# Patient Record
Sex: MALE | Race: WHITE | URBAN - METROPOLITAN AREA
[De-identification: names, ages, dates, MRNs, and addresses within clinical notes are randomized per-mention and may not be internally consistent; named-entity substitution may affect disease eponyms.]

---

## 2020-02-28 ENCOUNTER — INPATIENT (INPATIENT)
Facility: HOSPITAL | Age: 50
LOS: 3 days | Discharge: HOME | End: 2020-03-03
Attending: HOSPITALIST | Admitting: HOSPITALIST
Payer: SELF-PAY

## 2020-02-28 VITALS
RESPIRATION RATE: 16 BRPM | WEIGHT: 190.04 LBS | SYSTOLIC BLOOD PRESSURE: 146 MMHG | TEMPERATURE: 98 F | HEART RATE: 90 BPM | DIASTOLIC BLOOD PRESSURE: 81 MMHG | OXYGEN SATURATION: 98 %

## 2020-02-28 DIAGNOSIS — I25.10 ATHEROSCLEROTIC HEART DISEASE OF NATIVE CORONARY ARTERY WITHOUT ANGINA PECTORIS: ICD-10-CM

## 2020-02-28 LAB
ALBUMIN SERPL ELPH-MCNC: 5 G/DL — SIGNIFICANT CHANGE UP (ref 3.5–5.2)
ALP SERPL-CCNC: 96 U/L — SIGNIFICANT CHANGE UP (ref 30–115)
ALT FLD-CCNC: 24 U/L — SIGNIFICANT CHANGE UP (ref 0–41)
ANION GAP SERPL CALC-SCNC: 14 MMOL/L — SIGNIFICANT CHANGE UP (ref 7–14)
AST SERPL-CCNC: 23 U/L — SIGNIFICANT CHANGE UP (ref 0–41)
BILIRUB SERPL-MCNC: 0.3 MG/DL — SIGNIFICANT CHANGE UP (ref 0.2–1.2)
BUN SERPL-MCNC: 12 MG/DL — SIGNIFICANT CHANGE UP (ref 10–20)
CALCIUM SERPL-MCNC: 10.2 MG/DL — HIGH (ref 8.5–10.1)
CHLORIDE SERPL-SCNC: 103 MMOL/L — SIGNIFICANT CHANGE UP (ref 98–110)
CO2 SERPL-SCNC: 26 MMOL/L — SIGNIFICANT CHANGE UP (ref 17–32)
CREAT SERPL-MCNC: 0.9 MG/DL — SIGNIFICANT CHANGE UP (ref 0.7–1.5)
GLUCOSE SERPL-MCNC: 95 MG/DL — SIGNIFICANT CHANGE UP (ref 70–99)
HCT VFR BLD CALC: 47.8 % — SIGNIFICANT CHANGE UP (ref 42–52)
HGB BLD-MCNC: 16.1 G/DL — SIGNIFICANT CHANGE UP (ref 14–18)
LIDOCAIN IGE QN: 33 U/L — SIGNIFICANT CHANGE UP (ref 7–60)
MCHC RBC-ENTMCNC: 30.1 PG — SIGNIFICANT CHANGE UP (ref 27–31)
MCHC RBC-ENTMCNC: 33.7 G/DL — SIGNIFICANT CHANGE UP (ref 32–37)
MCV RBC AUTO: 89.3 FL — SIGNIFICANT CHANGE UP (ref 80–94)
NRBC # BLD: 0 /100 WBCS — SIGNIFICANT CHANGE UP (ref 0–0)
PLATELET # BLD AUTO: 317 K/UL — SIGNIFICANT CHANGE UP (ref 130–400)
POTASSIUM SERPL-MCNC: 4.5 MMOL/L — SIGNIFICANT CHANGE UP (ref 3.5–5)
POTASSIUM SERPL-SCNC: 4.5 MMOL/L — SIGNIFICANT CHANGE UP (ref 3.5–5)
PROT SERPL-MCNC: 8 G/DL — SIGNIFICANT CHANGE UP (ref 6–8)
RBC # BLD: 5.35 M/UL — SIGNIFICANT CHANGE UP (ref 4.7–6.1)
RBC # FLD: 12.1 % — SIGNIFICANT CHANGE UP (ref 11.5–14.5)
SODIUM SERPL-SCNC: 143 MMOL/L — SIGNIFICANT CHANGE UP (ref 135–146)
TROPONIN T SERPL-MCNC: <0.01 NG/ML — SIGNIFICANT CHANGE UP
WBC # BLD: 8.73 K/UL — SIGNIFICANT CHANGE UP (ref 4.8–10.8)
WBC # FLD AUTO: 8.73 K/UL — SIGNIFICANT CHANGE UP (ref 4.8–10.8)

## 2020-02-28 PROCEDURE — 93010 ELECTROCARDIOGRAM REPORT: CPT

## 2020-02-28 PROCEDURE — 99220: CPT

## 2020-02-28 PROCEDURE — 71045 X-RAY EXAM CHEST 1 VIEW: CPT | Mod: 26

## 2020-02-28 PROCEDURE — 93010 ELECTROCARDIOGRAM REPORT: CPT | Mod: 77

## 2020-02-28 RX ORDER — ASPIRIN/CALCIUM CARB/MAGNESIUM 324 MG
81 TABLET ORAL ONCE
Refills: 0 | Status: COMPLETED | OUTPATIENT
Start: 2020-02-28 | End: 2020-02-28

## 2020-02-28 RX ADMIN — Medication 81 MILLIGRAM(S): at 21:22

## 2020-02-28 NOTE — ED PROVIDER NOTE - NS ED ROS FT
General: No fever, chills, or weakness.  Eyes:  No visual changes, eye pain or discharge.  ENMT:  No hearing changes, pain, no sore throat or runny nose, no difficulty swallowing  Cardiac: CP, SOB. No palpitations.  Respiratory:  No cough or respiratory distress. No hemoptysis. No history of asthma or RAD.  GI:  No nausea, vomiting, diarrhea or abdominal pain.  :  No dysuria, frequency or burning.  MS:  No myalgia, muscle weakness, joint pain or back pain.  Neuro:  No headache.  No LOC. No change in ambulation. No dizziness.  Skin:  No skin rash.

## 2020-02-28 NOTE — ED PROVIDER NOTE - PROGRESS NOTE DETAILS
49M h/o HLD, hypertriglyceridemia, active smoker p/w CP for 1 week, radiating to back and left arm. A/w LEES. No other sxs, PE risk factors. Strong family history. Currently in no CP. EKG non ischemic. Pending labs and XR. Will obs, pt is amenable to obs. AA: Will obs, negative troponin

## 2020-02-28 NOTE — ED CDU PROVIDER INITIAL DAY NOTE - OBJECTIVE STATEMENT
50y/o male with pmh of high triglycerides, hld- not on meds, pt. c/o left sided cp associated with mid back pain, ;left arm pain and dyspnea on exertion. denies recent illnesses, fever, cough, vomiting, abdominal pain. pt. is a smoker. pt.'s younger brother has a cardiac stent, pt.'s father and older brother had cabg done.

## 2020-02-28 NOTE — ED PROVIDER NOTE - OBJECTIVE STATEMENT
49M h/o HLD, hypertriglyceridemia, active smoker p/w substernal CP for 1 week. Sharp, radiates to back and down left arm. Worse w/ exertion. A/w LEES. Pt has never had CP or LEES before. Denies fever/chills, cough, palpitations, abd pain, n/v/d, recent travel, leg swelling, recent surgery/immobilization. Pt's brother had CABG in mid 50s, father had CABG in 70s.

## 2020-02-28 NOTE — ED ADULT NURSE NOTE - OBJECTIVE STATEMENT
Pt presents c/o substernal chest pain sharp pain x 1 week radiating to the back down to the left arm , worse with exertion .Pt as of this moment denies any chest pain , denies headache , denies dizziness , denies arm pain , denies neck pain , denies abdominal pain , denies back pain , AO  x 4 , no labored breathing , speaks in full sentences , denies nausea , no vomiting noted , denies palpitations , no SOB , no cough ,,ambulatory , afebrile , not diaphoretic

## 2020-02-28 NOTE — ED PROVIDER NOTE - ATTENDING CONTRIBUTION TO CARE
48 yo m with pmh of HLD, presents with cp x 1 week.  pt says radiates down his left arm.  also with LEES.  no recent travel or long drives, no leg swelling, no cough, no fever.  +fam h/o cad.  exam: nad, ncat, perrl, eomi, mmm, rrr, ctab, abd soft, nt,nd aox3, imp: pt with chest pain, +fam h/o, and current smoker.  will r/o acs, ekg, cxr

## 2020-02-28 NOTE — ED PROVIDER NOTE - PHYSICAL EXAMINATION
Constitutional: Well developed, well nourished. NAD. Good general hygiene  Head: Atraumatic.  Eyes: PERRLA. EOMI without discomfort.   ENT: No nasal discharge. Mucous membranes moist.  Neck: Supple. Painless ROM.  Cardiovascular: Regular rhythm. Regular rate. Normal S1 and S2. No murmurs. 2+ pulses in all extremities.   Pulmonary: Normal respiratory rate and effort. Lungs clear to auscultation bilaterally. No wheezing, rales, or rhonchi. Bilateral, equal lung expansion.   Abdominal: Soft. Nondistended. Nontender. No rebound or guarding.   Extremities. Pelvis stable. No lower extremity edema. Symmetric calves.  Skin: No rashes.   Neuro: AAOx3. No focal neurological deficits.

## 2020-02-28 NOTE — ED ADULT TRIAGE NOTE - CHIEF COMPLAINT QUOTE
pt c/o midsternal chest pain/abdominal pain/sob with exertion x 1 week worsening this afternoon with intermittent nausea /headache. denies numbness/tingling.

## 2020-02-29 LAB
CHOLEST SERPL-MCNC: 230 MG/DL — HIGH (ref 100–200)
HDLC SERPL-MCNC: 45 MG/DL — SIGNIFICANT CHANGE UP
LIPID PNL WITH DIRECT LDL SERPL: 164 MG/DL — HIGH (ref 4–129)
TOTAL CHOLESTEROL/HDL RATIO MEASUREMENT: 5.1 RATIO — SIGNIFICANT CHANGE UP (ref 4–5.5)
TRIGL SERPL-MCNC: 166 MG/DL — HIGH (ref 10–149)
TROPONIN T SERPL-MCNC: <0.01 NG/ML — SIGNIFICANT CHANGE UP

## 2020-02-29 PROCEDURE — 99217: CPT

## 2020-02-29 PROCEDURE — 75574 CT ANGIO HRT W/3D IMAGE: CPT | Mod: 26

## 2020-02-29 PROCEDURE — 99222 1ST HOSP IP/OBS MODERATE 55: CPT

## 2020-02-29 RX ORDER — ATORVASTATIN CALCIUM 80 MG/1
80 TABLET, FILM COATED ORAL AT BEDTIME
Refills: 0 | Status: DISCONTINUED | OUTPATIENT
Start: 2020-02-29 | End: 2020-03-03

## 2020-02-29 RX ORDER — ASPIRIN/CALCIUM CARB/MAGNESIUM 324 MG
81 TABLET ORAL DAILY
Refills: 0 | Status: DISCONTINUED | OUTPATIENT
Start: 2020-02-29 | End: 2020-03-03

## 2020-02-29 RX ORDER — METOPROLOL TARTRATE 50 MG
50 TABLET ORAL ONCE
Refills: 0 | Status: COMPLETED | OUTPATIENT
Start: 2020-02-29 | End: 2020-02-29

## 2020-02-29 RX ORDER — HEPARIN SODIUM 5000 [USP'U]/ML
5000 INJECTION INTRAVENOUS; SUBCUTANEOUS EVERY 8 HOURS
Refills: 0 | Status: DISCONTINUED | OUTPATIENT
Start: 2020-02-29 | End: 2020-03-03

## 2020-02-29 RX ORDER — SODIUM CHLORIDE 9 MG/ML
1000 INJECTION INTRAMUSCULAR; INTRAVENOUS; SUBCUTANEOUS ONCE
Refills: 0 | Status: COMPLETED | OUTPATIENT
Start: 2020-02-29 | End: 2020-02-29

## 2020-02-29 RX ORDER — METOPROLOL TARTRATE 50 MG
5 TABLET ORAL ONCE
Refills: 0 | Status: DISCONTINUED | OUTPATIENT
Start: 2020-02-29 | End: 2020-03-01

## 2020-02-29 RX ORDER — METOPROLOL TARTRATE 50 MG
25 TABLET ORAL DAILY
Refills: 0 | Status: DISCONTINUED | OUTPATIENT
Start: 2020-02-29 | End: 2020-03-01

## 2020-02-29 RX ADMIN — Medication 81 MILLIGRAM(S): at 18:03

## 2020-02-29 RX ADMIN — Medication 50 MILLIGRAM(S): at 06:35

## 2020-02-29 RX ADMIN — Medication 25 MILLIGRAM(S): at 18:02

## 2020-02-29 RX ADMIN — HEPARIN SODIUM 5000 UNIT(S): 5000 INJECTION INTRAVENOUS; SUBCUTANEOUS at 22:15

## 2020-02-29 RX ADMIN — SODIUM CHLORIDE 1000 MILLILITER(S): 9 INJECTION INTRAMUSCULAR; INTRAVENOUS; SUBCUTANEOUS at 16:58

## 2020-02-29 RX ADMIN — Medication 50 MILLIGRAM(S): at 09:24

## 2020-02-29 RX ADMIN — Medication 50 MILLIGRAM(S): at 10:51

## 2020-02-29 RX ADMIN — ATORVASTATIN CALCIUM 80 MILLIGRAM(S): 80 TABLET, FILM COATED ORAL at 22:16

## 2020-02-29 NOTE — ED ADULT NURSE REASSESSMENT NOTE - NS ED NURSE REASSESS COMMENT FT1
Pt reassessed A/O times 4 Vs stable remain comfortable CTA order is done completed ,pt is seen evaluate by Ed attending did eat his lunch no N/V  no dizziness ambulate steady ,pt admitted to  telemetry will continue to monitor .
report given to ricky Guerrero transported to ED 3bed 8
Pt observed comfortably sleeping, no acute distress noted. Continue to monitor.
Received pt from main ED, alert, oriented x 3, denies any pain, no complaints offered at this time. Will monitor.

## 2020-02-29 NOTE — CONSULT NOTE ADULT - ASSESSMENT
48y/o male with PMH of high triglycerides, DLD , HTN ,  not on meds, presented to ED with LEES and intermittent CP and back pain 50y/o male with PMH of high triglycerides, DLD , HTN ,  not on meds, presented to ED with LEES and intermittent CP and back pain.      Impression:  New onset dyspnea on exertion associated with dizziness and atypical CP -  Abnormal CCTA: r/o occlusive CAD   Chronic smoker, h/o HTN/DLD, family h/o CAD    Plan:  Tele monitoring  ASA, high intensity statin, low dose BB  check ECHO, HBA1C  possible cardiac cath on monday     will d/w attending 48y/o male with PMH of high triglycerides, DLD , HTN ,  not on meds, presented to ED with LEES and intermittent CP and back pain.      Impression:  New onset dyspnea on exertion associated with dizziness and atypical CP -  Abnormal CCTA: r/o occlusive CAD (MI ruled out, no ischemia on ECG, currently asymptomatic)  Chronic smoker, h/o HTN/DLD, family h/o CAD    Plan:  Tele monitoring  ASA, high intensity statin, low dose BB  check ECHO, HBA1C  possible cardiac cath on monday     will d/w attending

## 2020-02-29 NOTE — H&P ADULT - NSICDXFAMILYHX_GEN_ALL_CORE_FT
FAMILY HISTORY:  Father  Still living? Unknown  Family history of coronary artery disease, Age at diagnosis: Age Unknown    Sibling  Still living? Unknown  Family history of coronary artery disease, Age at diagnosis: Age Unknown

## 2020-02-29 NOTE — ED CDU PROVIDER DISPOSITION NOTE - CLINICAL COURSE
Pt placed into observation for chest pain. CE neg. CCTA + single vessel disease. Admitted to tele for further evaluation.

## 2020-02-29 NOTE — CONSULT NOTE ADULT - ATTENDING COMMENTS
Seen / examined last night on rounds.  Above reviewed.    No cardiac history.  Risk factors include HTN, HLD, smoking, and family history.    New onset exertional dyspnea (few days).  Hemodynamics stable.  Euvolemic.  EKG: NSR, RSR'  Ruled-out MI.  CCTA: 2 moderate proximal RCA stenoses.    Overall, concerning for low-threshold angina.    RECOMMEND:  - ASA, Lipitor, low-dose beta-blocker (metoprolol 12.5mg q6).  - ECHO.  - Cath Monday.

## 2020-02-29 NOTE — H&P ADULT - ATTENDING COMMENTS
Pt seen and examined independently. He feels well now and denies SOB or chest pain/pressure.  He presented with 2 weeks of SOB on exertion along with left sided chest pain and radiation to the back and left arm.  No other complaints. + cough attributed to tobacco use  He has multiple risk factors for CAD: smoker, hyperlipidemia, + FH of CAD    VS reviewed  general - NAD  chest - CTA b/l  CVS - RRR no murmur  no bruits  abdomen - soft, NT, ND  extremities - no edema  Neuro - AA&O    CCTA + moderate stenosis of RCA  for cardiac cath tomorrow  NPO after midnight  ASA/statin/Bblocker  check ECHO  telemetry reviewed - no events  smoking cessation  Hyperlipidemia - continue high intensity statin    I reviewed the resident's note and I agree with the history, physical exam, assessment and plan with additions as above.    PROGRESS NOTE HANDOFF    Pending: cardiac cath on Monday    Disposition: home

## 2020-02-29 NOTE — CONSULT NOTE ADULT - SUBJECTIVE AND OBJECTIVE BOX
Patient is a 49y old  Male who presents with a chief complaint of Chest pain (29 Feb 2020 14:58)    HPI:  48y/o male with PMH of high triglycerides, DLD , HTN ,  not on meds, presented to ED with  complaints of  left sided to substernal chest pain  associated with mid back pain, pt does endorsed radiations of pain to  left arm along with dyspnea on exertion. He denies recent illnesses, fever, cough, vomiting, abdominal pain. pt is a smoker. And his younger bother has a cardiac stent at age of 55 and father had CABG at age of 71. Pt underwent CTA coronary in ED which was consistent with CAD RAD category 3 with RCA territory stenosis . Pt will be admitted for further workup, (29 Feb 2020 14:58)      ROS:  All other systems reviewed and are negative    PAST MEDICAL & SURGICAL HISTORY  HLD (hyperlipidemia)      FAMILY HISTORY:  FAMILY HISTORY:  Family history of coronary artery disease (Father, Sibling)      SOCIAL HISTORY:  []smoker- 2PPD  []Alcohol  []Drug    ALLERGIES:  No Known Allergies      MEDICATIONS:  MEDICATIONS  (STANDING):  aspirin  chewable 81 milliGRAM(s) Oral daily  atorvastatin 80 milliGRAM(s) Oral at bedtime  heparin  Injectable 5000 Unit(s) SubCutaneous every 8 hours  metoprolol succinate ER 25 milliGRAM(s) Oral daily  metoprolol tartrate Injectable 5 milliGRAM(s) IV Push once    MEDICATIONS  (PRN):      HOME MEDICATIONS:  Home Medications:      VITALS:   T(F): 97.1 (02-29 @ 16:08), Max: 98.5 (02-29 @ 15:04)  HR: 66 (02-29 @ 16:08) (62 - 90)  BP: 107/58 (02-29 @ 16:08) (97/58 - 146/81)  BP(mean): --  RR: 16 (02-29 @ 16:08) (16 - 19)  SpO2: 98% (02-29 @ 16:00) (97% - 99%)    I&O's Summary      PHYSICAL EXAM:  GEN: Alert and oriented X 3, No acute distress  HEENT: no pallor  NECK: Supple, no JVD  LUNGS: Clear to auscultation bilaterally  CARDIOVASCULAR: S1/S2 regular, no murmurs or rubs  ABD: Soft, BS+  EXT: No Lower extremity edema/cyanosis  NEURO: Non focal  SKIN: Intact    LABS:                        16.1   8.73  )-----------( 317      ( 28 Feb 2020 18:40 )             47.8     02-28    143  |  103  |  12  ----------------------------<  95  4.5   |  26  |  0.9    Ca    10.2<H>      28 Feb 2020 18:40    TPro  8.0  /  Alb  5.0  /  TBili  0.3  /  DBili  x   /  AST  23  /  ALT  24  /  AlkPhos  96  02-28      Troponin T, Serum: <0.01 ng/mL (02-29-20 @ 00:04)  Troponin T, Serum: <0.01 ng/mL (02-28-20 @ 18:40)    CARDIAC MARKERS ( 29 Feb 2020 00:04 )  x     / <0.01 ng/mL / x     / x     / x      CARDIAC MARKERS ( 28 Feb 2020 18:40 )  x     / <0.01 ng/mL / x     / x     / x            Troponin trend:      02-29 Chol 230<H> <H> HDL 45 Trig 166<H>  Hemoglobin A1C   Thyroid      RADIOLOGY:  -CXR:  < from: Xray Chest 1 View-PORTABLE IMMEDIATE (02.28.20 @ 20:40) >    Impression:      No radiographic evidence of acute cardiopulmonary disease.    < end of copied text >    -CCTA:  < from: CT Heart with Coronaries (02.29.20 @ 12:41) >    IMPRESSION:    1.  Two moderate stenoses in the proximal RCA due to noncalcified plaque. Other findings as above. Follow-up cardiology is recommended.    2. Total coronary calcium score is 81. Total calcium volume is 75.  For a 49 old female , this corresponds to 95 percentile.     CAD RAD: 3    < end of copied text >      ECG:  < from: 12 Lead ECG (02.28.20 @ 18:37) >  Diagnosis Line *** Poor data quality, interpretation may be adversely affected  Normal sinus rhythm  Incomplete right bundle branch block  Borderline ECG    < end of copied text >    TELEMETRY EVENTS:  none Patient is a 49y old  Male who presents with a chief complaint of Chest pain (29 Feb 2020 14:58)    HPI:  50y/o male with PMH of high triglycerides, DLD , HTN ,  not on meds, presented to ED with  complaints of  left sided to substernal chest pain  associated with mid back pain, pt does endorsed radiations of pain to  left arm along with dyspnea on exertion. He denies recent illnesses, fever, cough, vomiting, abdominal pain. pt is a smoker. And his younger bother has a cardiac stent at age of 55 and father had CABG at age of 71. Pt underwent CTA coronary in ED which was consistent with CAD RAD category 3 with RCA territory stenosis . Pt will be admitted for further workup, (29 Feb 2020 14:58)    Cardiology fellow addendum:  pt is very functional male with 1 week h/o dyspnea and dizziness upon moderate exertion along with intermittent episodes of sharp chest pain and back pain.     ROS:  All other systems reviewed and are negative    PAST MEDICAL & SURGICAL HISTORY  HLD (hyperlipidemia)      FAMILY HISTORY:  FAMILY HISTORY:  Family history of coronary artery disease (Father, Sibling)      SOCIAL HISTORY:  []smoker- 2PPD  []Alcohol  []Drug    ALLERGIES:  No Known Allergies      MEDICATIONS:  MEDICATIONS  (STANDING):  aspirin  chewable 81 milliGRAM(s) Oral daily  atorvastatin 80 milliGRAM(s) Oral at bedtime  heparin  Injectable 5000 Unit(s) SubCutaneous every 8 hours  metoprolol succinate ER 25 milliGRAM(s) Oral daily  metoprolol tartrate Injectable 5 milliGRAM(s) IV Push once    MEDICATIONS  (PRN):      HOME MEDICATIONS:  Home Medications:      VITALS:   T(F): 97.1 (02-29 @ 16:08), Max: 98.5 (02-29 @ 15:04)  HR: 66 (02-29 @ 16:08) (62 - 90)  BP: 107/58 (02-29 @ 16:08) (97/58 - 146/81)  BP(mean): --  RR: 16 (02-29 @ 16:08) (16 - 19)  SpO2: 98% (02-29 @ 16:00) (97% - 99%)    I&O's Summary      PHYSICAL EXAM:  GEN: Alert and oriented X 3, No acute distress  HEENT: no pallor  NECK: Supple, no JVD  LUNGS: Clear to auscultation bilaterally  CARDIOVASCULAR: S1/S2 regular, no murmurs or rubs  ABD: Soft, BS+  EXT: No Lower extremity edema/cyanosis  NEURO: Non focal  SKIN: Intact    LABS:                        16.1   8.73  )-----------( 317      ( 28 Feb 2020 18:40 )             47.8     02-28    143  |  103  |  12  ----------------------------<  95  4.5   |  26  |  0.9    Ca    10.2<H>      28 Feb 2020 18:40    TPro  8.0  /  Alb  5.0  /  TBili  0.3  /  DBili  x   /  AST  23  /  ALT  24  /  AlkPhos  96  02-28      Troponin T, Serum: <0.01 ng/mL (02-29-20 @ 00:04)  Troponin T, Serum: <0.01 ng/mL (02-28-20 @ 18:40)    CARDIAC MARKERS ( 29 Feb 2020 00:04 )  x     / <0.01 ng/mL / x     / x     / x      CARDIAC MARKERS ( 28 Feb 2020 18:40 )  x     / <0.01 ng/mL / x     / x     / x            Troponin trend:      02-29 Chol 230<H> <H> HDL 45 Trig 166<H>  Hemoglobin A1C   Thyroid      RADIOLOGY:  -CXR:  < from: Xray Chest 1 View-PORTABLE IMMEDIATE (02.28.20 @ 20:40) >    Impression:      No radiographic evidence of acute cardiopulmonary disease.    < end of copied text >    -CCTA:  < from: CT Heart with Coronaries (02.29.20 @ 12:41) >    IMPRESSION:    1.  Two moderate stenoses in the proximal RCA due to noncalcified plaque. Other findings as above. Follow-up cardiology is recommended.    2. Total coronary calcium score is 81. Total calcium volume is 75.  For a 49 old female , this corresponds to 95 percentile.     CAD RAD: 3    < end of copied text >      ECG:  < from: 12 Lead ECG (02.28.20 @ 18:37) >  Diagnosis Line *** Poor data quality, interpretation may be adversely affected  Normal sinus rhythm  Incomplete right bundle branch block  Borderline ECG    < end of copied text >    TELEMETRY EVENTS:  none

## 2020-02-29 NOTE — ED CDU PROVIDER SUBSEQUENT DAY NOTE - PROGRESS NOTE DETAILS
trops negative x2. pt. remains on cardiac monitor. will get ccta in the morning pt resting comfortably, HR 69, ordered more metoprolol, awaiting CCTA CAD RADS 3 , does not have cardiologist, no currenty omn meds, + smoker with family hx of heart dz, admitted to tele for further cardiac eval

## 2020-02-29 NOTE — H&P ADULT - ASSESSMENT
48y/o male with PMH of high triglycerides, DLD , HTN ,  not on meds, presented to ED with  complaints of  left sided to substernal chest pain  associated with mid back pain, pt does endorsed radiations of pain to  left arm along with dyspnea on exertion. He denies recent illnesses, fever, cough, vomiting, abdominal pain. pt is a smoker. And his younger bother has a cardiac stent at age of 55 and father had CABG at age of 71. Pt underwent CTA coronary in ED which was consistent with CAD RAD category 3 with RCA territory stenosis . Pt will be admitted for further workup,     #) Typical Chest Pain in high risk Pt  - ACS ruled out already  - CTA consistent with RCA stenosis  - Cholesterol panel consistent with elevation , pt is a daily smoker ( will benefit from Chantix)  - will start statin , bb and aspirin  - Will need BP control  - Cardiology eval    #) DLD  - statin    Diet DASH    #) DVT PPX Heparin    Dispo per Cards

## 2020-02-29 NOTE — H&P ADULT - NSHPLABSRESULTS_GEN_ALL_CORE
CBC Full  -  ( 28 Feb 2020 18:40 )  WBC Count : 8.73 K/uL  RBC Count : 5.35 M/uL  Hemoglobin : 16.1 g/dL  Hematocrit : 47.8 %  Platelet Count - Automated : 317 K/uL  Mean Cell Volume : 89.3 fL  Mean Cell Hemoglobin : 30.1 pg  Mean Cell Hemoglobin Concentration : 33.7 g/dL  Auto Neutrophil # : x  Auto Lymphocyte # : x  Auto Monocyte # : x  Auto Eosinophil # : x  Auto Basophil # : x  Auto Neutrophil % : x  Auto Lymphocyte % : x  Auto Monocyte % : x  Auto Eosinophil % : x  Auto Basophil % : x    02-28    143  |  103  |  12  ----------------------------<  95  4.5   |  26  |  0.9    Ca    10.2<H>      28 Feb 2020 18:40    TPro  8.0  /  Alb  5.0  /  TBili  0.3  /  DBili  x   /  AST  23  /  ALT  24  /  AlkPhos  96  02-28 CBC Full  -  ( 28 Feb 2020 18:40 )  WBC Count : 8.73 K/uL  RBC Count : 5.35 M/uL  Hemoglobin : 16.1 g/dL  Hematocrit : 47.8 %  Platelet Count - Automated : 317 K/uL  Mean Cell Volume : 89.3 fL  Mean Cell Hemoglobin : 30.1 pg  Mean Cell Hemoglobin Concentration : 33.7 g/dL  Auto Neutrophil # : x  Auto Lymphocyte # : x  Auto Monocyte # : x  Auto Eosinophil # : x  Auto Basophil # : x  Auto Neutrophil % : x  Auto Lymphocyte % : x  Auto Monocyte % : x  Auto Eosinophil % : x  Auto Basophil % : x    02-28    143  |  103  |  12  ----------------------------<  95  4.5   |  26  |  0.9    Ca    10.2<H>      28 Feb 2020 18:40    TPro  8.0  /  Alb  5.0  /  TBili  0.3  /  DBili  x   /  AST  23  /  ALT  24  /  AlkPhos  96  02-28    Lipid Profile (02.29.20 @ 00:04)    Total Cholesterol/HDL Ratio Measurement: 5.1 Ratio    Cholesterol, Serum: 230 mg/dL    Triglycerides, Serum: 166 mg/dL    HDL Cholesterol, Serum: 45: HDL Levels >/= 60 mg/dL are considered beneficial and a "negative" risk  factor.  Effective 08/15/2018: New reference range and interpretive comment. mg/dL    Direct LDL: 164: LDL Cholesterol (mg/dL) --- Interpretive Comment (for adults 18 and over)  Optimal LDL Level may vary based on clinical situation  Below 70                   Ideal for people at very high risk of heart  disease  Below 100                  Ideal for people at risk of heart disease  100 - 129                    [place]  130 - 159                    Borderline high  160 - 189                    High  190 and Above           Very high mg/dL

## 2020-02-29 NOTE — H&P ADULT - HISTORY OF PRESENT ILLNESS
48y/o male with PMH of high triglycerides, DLD , HTN ,  not on meds, presented to ED with  complaints of  left sided to substernal chest pain  associated with mid back pain, pt does endorsed radiations of pain to  left arm along with dyspnea on exertion. He denies recent illnesses, fever, cough, vomiting, abdominal pain. pt is a smoker. And his younger bother has a cardiac stent at age of 55 and father had CABG at age of 71. Pt underwent CTA coronary in ED which was consistent with CAD RAD category 3 with RCA territory stenosis . Pt will be admitted for further workup,

## 2020-03-01 LAB
ANION GAP SERPL CALC-SCNC: 11 MMOL/L — SIGNIFICANT CHANGE UP (ref 7–14)
BUN SERPL-MCNC: 13 MG/DL — SIGNIFICANT CHANGE UP (ref 10–20)
CALCIUM SERPL-MCNC: 9.3 MG/DL — SIGNIFICANT CHANGE UP (ref 8.5–10.1)
CHLORIDE SERPL-SCNC: 103 MMOL/L — SIGNIFICANT CHANGE UP (ref 98–110)
CO2 SERPL-SCNC: 27 MMOL/L — SIGNIFICANT CHANGE UP (ref 17–32)
CREAT SERPL-MCNC: 0.8 MG/DL — SIGNIFICANT CHANGE UP (ref 0.7–1.5)
GLUCOSE SERPL-MCNC: 101 MG/DL — HIGH (ref 70–99)
POTASSIUM SERPL-MCNC: 4.3 MMOL/L — SIGNIFICANT CHANGE UP (ref 3.5–5)
POTASSIUM SERPL-SCNC: 4.3 MMOL/L — SIGNIFICANT CHANGE UP (ref 3.5–5)
SODIUM SERPL-SCNC: 141 MMOL/L — SIGNIFICANT CHANGE UP (ref 135–146)

## 2020-03-01 PROCEDURE — 93306 TTE W/DOPPLER COMPLETE: CPT | Mod: 26

## 2020-03-01 PROCEDURE — 99223 1ST HOSP IP/OBS HIGH 75: CPT

## 2020-03-01 RX ORDER — METOPROLOL TARTRATE 50 MG
12.5 TABLET ORAL EVERY 6 HOURS
Refills: 0 | Status: DISCONTINUED | OUTPATIENT
Start: 2020-03-02 | End: 2020-03-03

## 2020-03-01 RX ADMIN — ATORVASTATIN CALCIUM 80 MILLIGRAM(S): 80 TABLET, FILM COATED ORAL at 22:28

## 2020-03-01 RX ADMIN — Medication 81 MILLIGRAM(S): at 11:51

## 2020-03-01 RX ADMIN — HEPARIN SODIUM 5000 UNIT(S): 5000 INJECTION INTRAVENOUS; SUBCUTANEOUS at 05:57

## 2020-03-01 RX ADMIN — HEPARIN SODIUM 5000 UNIT(S): 5000 INJECTION INTRAVENOUS; SUBCUTANEOUS at 14:08

## 2020-03-01 RX ADMIN — Medication 12.5 MILLIGRAM(S): at 23:35

## 2020-03-01 RX ADMIN — HEPARIN SODIUM 5000 UNIT(S): 5000 INJECTION INTRAVENOUS; SUBCUTANEOUS at 22:28

## 2020-03-01 RX ADMIN — Medication 25 MILLIGRAM(S): at 05:57

## 2020-03-01 NOTE — PROGRESS NOTE ADULT - SUBJECTIVE AND OBJECTIVE BOX
Hospital Day:  1d    Subjective: Patient is a 49y old  Male who presents with a chief complaint of Chest pain (29 Feb 2020 18:26)    Pt seen and evaluated at bedside. No over the night acute events reported.   Says feeling well. Last CP episode was yesterday.   Donald CP/SOB/Abd pain/Nausea/Vomiting/Dysuria today.    Past Medical Hx:   HLD (hyperlipidemia)    Past Sx:    Allergies:  No Known Allergies    Current Meds:   Standng Meds:  aspirin  chewable 81 milliGRAM(s) Oral daily  atorvastatin 80 milliGRAM(s) Oral at bedtime  heparin  Injectable 5000 Unit(s) SubCutaneous every 8 hours  metoprolol succinate ER 25 milliGRAM(s) Oral daily  metoprolol tartrate Injectable 5 milliGRAM(s) IV Push once    PRN Meds:    HOME MEDICATIONS:    Vital Signs:   T(F): 96.8 (03-01-20 @ 05:20), Max: 98.5 (02-29-20 @ 15:04)  HR: 70 (03-01-20 @ 05:20) (62 - 70)  BP: 116/59 (03-01-20 @ 05:20) (97/58 - 136/66)  RR: 18 (02-29-20 @ 20:50) (16 - 18)  SpO2: 98% (03-01-20 @ 05:20) (98% - 99%)      02-29-20 @ 07:01  -  03-01-20 @ 07:00  --------------------------------------------------------  IN: 240 mL / OUT: 0 mL / NET: 240 mL        Physical Exam:   GENERAL: NAD, resting in bed  HEENT: NCAT  CHEST/LUNG: CTAB, no wheezing or labored respirations.   HEART: Regular rate and rhythm; s1 s2 appreciated, No murmurs, rubs, or gallops  ABDOMEN: Soft, Nontender, Nondistended; Bowel sounds present  EXTREMITIES: No LE edema b/l  NERVOUS SYSTEM:  Alert & Oriented X3    Labs:                         16.1   8.73  )-----------( 317      ( 28 Feb 2020 18:40 )             47.8       01 Mar 2020 05:43    141    |  103    |  13     ----------------------------<  101    4.3     |  27     |  0.8      Ca    9.3        01 Mar 2020 05:43    TPro  8.0    /  Alb  5.0    /  TBili  0.3    /  DBili  x      /  AST  23     /  ALT  24     /  AlkPhos  96     28 Feb 2020 18:40    Amylase --, Lipase 33, 02-28-20 @ 18:40    Troponin <0.01, CKMB --, CK -- 02-29-20 @ 00:04  Troponin <0.01, CKMB --, CK -- 02-28-20 @ 18:40    Radiology:     < from: CT Heart with Coronaries (02.29.20 @ 12:41) >  IMPRESSION:    1.  Two moderate stenoses in the proximal RCA due to noncalcified plaque. Other findings as above. Follow-up cardiology is recommended.    2. Total coronary calcium score is 81. Total calcium volume is 75.  For a 49 old female , this corresponds to 95 percentile.     < end of copied text >    Assessment and Plan:    50y/o male with PMH of high triglycerides, DLD , HTN ,  not on meds, presented to ED with  complaints of  left sided to substernal chest pain  associated with mid back pain, pt does endorsed radiations of pain to  left arm along with dyspnea on exertion. He denies recent illnesses, fever, cough, vomiting, abdominal pain. pt is a smoker. And his younger bother has a cardiac stent at age of 55 and father had CABG at age of 71. Pt underwent CTA coronary in ED which was consistent with CAD RAD category 3 with RCA territory stenosis . Pt will be admitted for further workup,     #) Typical Chest Pain in high risk Pt  - ACS ruled out already, Trops neg x2  - CTA consistent with RCA stenosis  - Cholesterol panel consistent with elevation , pt is a daily smoker ( will benefit from Chantix)  - pt started on ASA, statin, bb  - Echo pending  - cath tomorrow    #) DLD  - statin    # HTN - controlled  - not on meds    # Nicotine dependence  - recommend cessation    # DVT ppx - Heparin sc  # GI ppx - not indicated  # Diet - DASH  Full code

## 2020-03-02 LAB
ANION GAP SERPL CALC-SCNC: 14 MMOL/L — SIGNIFICANT CHANGE UP (ref 7–14)
APTT BLD: 35.6 SEC — SIGNIFICANT CHANGE UP (ref 27–39.2)
BASOPHILS # BLD AUTO: 0.07 K/UL — SIGNIFICANT CHANGE UP (ref 0–0.2)
BASOPHILS NFR BLD AUTO: 0.8 % — SIGNIFICANT CHANGE UP (ref 0–1)
BLD GP AB SCN SERPL QL: SIGNIFICANT CHANGE UP
BUN SERPL-MCNC: 13 MG/DL — SIGNIFICANT CHANGE UP (ref 10–20)
CALCIUM SERPL-MCNC: 9.8 MG/DL — SIGNIFICANT CHANGE UP (ref 8.5–10.1)
CHLORIDE SERPL-SCNC: 102 MMOL/L — SIGNIFICANT CHANGE UP (ref 98–110)
CO2 SERPL-SCNC: 26 MMOL/L — SIGNIFICANT CHANGE UP (ref 17–32)
CREAT SERPL-MCNC: 0.9 MG/DL — SIGNIFICANT CHANGE UP (ref 0.7–1.5)
EOSINOPHIL # BLD AUTO: 0.4 K/UL — SIGNIFICANT CHANGE UP (ref 0–0.7)
EOSINOPHIL NFR BLD AUTO: 4.3 % — SIGNIFICANT CHANGE UP (ref 0–8)
GLUCOSE SERPL-MCNC: 83 MG/DL — SIGNIFICANT CHANGE UP (ref 70–99)
HCT VFR BLD CALC: 44.7 % — SIGNIFICANT CHANGE UP (ref 42–52)
HGB BLD-MCNC: 15 G/DL — SIGNIFICANT CHANGE UP (ref 14–18)
IMM GRANULOCYTES NFR BLD AUTO: 0.3 % — SIGNIFICANT CHANGE UP (ref 0.1–0.3)
INR BLD: 1.01 RATIO — SIGNIFICANT CHANGE UP (ref 0.65–1.3)
LYMPHOCYTES # BLD AUTO: 3.7 K/UL — HIGH (ref 1.2–3.4)
LYMPHOCYTES # BLD AUTO: 39.7 % — SIGNIFICANT CHANGE UP (ref 20.5–51.1)
MAGNESIUM SERPL-MCNC: 2 MG/DL — SIGNIFICANT CHANGE UP (ref 1.8–2.4)
MCHC RBC-ENTMCNC: 29.9 PG — SIGNIFICANT CHANGE UP (ref 27–31)
MCHC RBC-ENTMCNC: 33.6 G/DL — SIGNIFICANT CHANGE UP (ref 32–37)
MCV RBC AUTO: 89 FL — SIGNIFICANT CHANGE UP (ref 80–94)
MONOCYTES # BLD AUTO: 0.64 K/UL — HIGH (ref 0.1–0.6)
MONOCYTES NFR BLD AUTO: 6.9 % — SIGNIFICANT CHANGE UP (ref 1.7–9.3)
NEUTROPHILS # BLD AUTO: 4.47 K/UL — SIGNIFICANT CHANGE UP (ref 1.4–6.5)
NEUTROPHILS NFR BLD AUTO: 48 % — SIGNIFICANT CHANGE UP (ref 42.2–75.2)
NRBC # BLD: 0 /100 WBCS — SIGNIFICANT CHANGE UP (ref 0–0)
PLATELET # BLD AUTO: 309 K/UL — SIGNIFICANT CHANGE UP (ref 130–400)
POTASSIUM SERPL-MCNC: 5.1 MMOL/L — HIGH (ref 3.5–5)
POTASSIUM SERPL-SCNC: 5.1 MMOL/L — HIGH (ref 3.5–5)
PROTHROM AB SERPL-ACNC: 11.6 SEC — SIGNIFICANT CHANGE UP (ref 9.95–12.87)
RBC # BLD: 5.02 M/UL — SIGNIFICANT CHANGE UP (ref 4.7–6.1)
RBC # FLD: 12.1 % — SIGNIFICANT CHANGE UP (ref 11.5–14.5)
SODIUM SERPL-SCNC: 142 MMOL/L — SIGNIFICANT CHANGE UP (ref 135–146)
WBC # BLD: 9.31 K/UL — SIGNIFICANT CHANGE UP (ref 4.8–10.8)
WBC # FLD AUTO: 9.31 K/UL — SIGNIFICANT CHANGE UP (ref 4.8–10.8)

## 2020-03-02 PROCEDURE — 93458 L HRT ARTERY/VENTRICLE ANGIO: CPT | Mod: 26

## 2020-03-02 PROCEDURE — 99233 SBSQ HOSP IP/OBS HIGH 50: CPT

## 2020-03-02 RX ORDER — ACETAMINOPHEN 500 MG
650 TABLET ORAL EVERY 6 HOURS
Refills: 0 | Status: DISCONTINUED | OUTPATIENT
Start: 2020-03-02 | End: 2020-03-03

## 2020-03-02 RX ORDER — SODIUM CHLORIDE 9 MG/ML
1000 INJECTION INTRAMUSCULAR; INTRAVENOUS; SUBCUTANEOUS
Refills: 0 | Status: DISCONTINUED | OUTPATIENT
Start: 2020-03-02 | End: 2020-03-03

## 2020-03-02 RX ADMIN — ATORVASTATIN CALCIUM 80 MILLIGRAM(S): 80 TABLET, FILM COATED ORAL at 21:40

## 2020-03-02 RX ADMIN — Medication 12.5 MILLIGRAM(S): at 05:18

## 2020-03-02 RX ADMIN — HEPARIN SODIUM 5000 UNIT(S): 5000 INJECTION INTRAVENOUS; SUBCUTANEOUS at 21:40

## 2020-03-02 RX ADMIN — HEPARIN SODIUM 5000 UNIT(S): 5000 INJECTION INTRAVENOUS; SUBCUTANEOUS at 05:19

## 2020-03-02 RX ADMIN — Medication 12.5 MILLIGRAM(S): at 18:19

## 2020-03-02 RX ADMIN — Medication 12.5 MILLIGRAM(S): at 11:17

## 2020-03-02 RX ADMIN — Medication 81 MILLIGRAM(S): at 11:17

## 2020-03-02 RX ADMIN — Medication 12.5 MILLIGRAM(S): at 23:07

## 2020-03-02 NOTE — PROGRESS NOTE ADULT - SUBJECTIVE AND OBJECTIVE BOX
PREOPERATIVE DAY OF PROCEDURE EVALUATION:  I have personally seen and examined the patient.  Clinically stable.  Proceed with cath per my consult (Signed electronically by Justin Duran)  03-02-20 @ 14:28

## 2020-03-02 NOTE — PROGRESS NOTE ADULT - ATTENDING COMMENTS
patient seen and examined independently     LEES and atypical chest pain:   CT coronaries noted   Cath today   asa, BB, lipitor     #Progress Note Handoff  Pending (specify):  cath   Family discussion:patient   Disposition: Home once cleared by cardiology

## 2020-03-02 NOTE — PROGRESS NOTE ADULT - SUBJECTIVE AND OBJECTIVE BOX
Hospital Day:  2d    Subjective: Patient is a 49y old  Male who presents with a chief complaint of Chest pain (01 Mar 2020 09:35)    Pt seen and evaluated at bedside. No over the night acute events reported.   Donald CP/SOB/Abd pain/Nausea/Vomiting/Dysuria today.  pt to go for cath today. labs optimized.     Past Medical Hx:   HLD (hyperlipidemia)    Past Sx:    Allergies:  No Known Allergies    Current Meds:   Standng Meds:  aspirin  chewable 81 milliGRAM(s) Oral daily  atorvastatin 80 milliGRAM(s) Oral at bedtime  heparin  Injectable 5000 Unit(s) SubCutaneous every 8 hours  metoprolol tartrate 12.5 milliGRAM(s) Oral every 6 hours    PRN Meds:    HOME MEDICATIONS:    Vital Signs:   T(F): 98.2 (03-02-20 @ 04:52), Max: 98.2 (03-02-20 @ 04:52)  HR: 72 (03-02-20 @ 04:52) (68 - 76)  BP: 116/68 (03-02-20 @ 04:52) (104/65 - 125/79)  RR: 18 (03-02-20 @ 04:52) (18 - 18)  SpO2: --      03-01-20 @ 07:01  -  03-02-20 @ 07:00  --------------------------------------------------------  IN: 160 mL / OUT: 0 mL / NET: 160 mL        Physical Exam:   GENERAL: NAD, resting in bed  HEENT: NCAT  CHEST/LUNG: CTAB, no wheezing or labored respirations.   HEART: Regular rate and rhythm; s1 s2 appreciated, No murmurs, rubs, or gallops  ABDOMEN: Soft, Nontender, Nondistended; Bowel sounds present  EXTREMITIES: No LE edema b/l  NERVOUS SYSTEM:  Alert & Oriented X3      Labs:                         15.0   9.31  )-----------( 309      ( 02 Mar 2020 05:51 )             44.7     Neutophil% 48.0, Lymphocyte% 39.7, Monocyte% 6.9, Bands% 0.3 03-02-20 @ 05:51    02 Mar 2020 05:51    142    |  102    |  13     ----------------------------<  83     5.1     |  26     |  0.9      Ca    9.8        02 Mar 2020 05:51  Mg     2.0       02 Mar 2020 05:51         pTT    35.6             ----< 1.01 INR  (03-02-20 @ 05:51)    11.60        PT    Amylase --, Lipase 33, 02-28-20 @ 18:40    Troponin <0.01, CKMB --, CK -- 02-29-20 @ 00:04  Troponin <0.01, CKMB --, CK -- 02-28-20 @ 18:40    Radiology:   < from: Transthoracic Echocardiogram (03.01.20 @ 14:48) >  Summary:   1. Left ventricular ejection fraction, by visual estimation, is 60 to 65%.   2. Normal left ventricular size and wall thicknesses, with normal systolic and diastolic function.   3. The mean global longitudinal peak strain by speckle tracking is -17.9% which is borderline normal.   4. Estimated pulmonary artery systolic pressure is 36.1 mmHg assuming a right atrial pressure of 3 mmHg, which is consistent with borderline pulmonary hypertension.   5. LA volume Index is 9.0 ml/m² ml/m2.    < end of copied text >    Assessment and Plan:    50y/o male with PMH of high triglycerides, DLD , HTN ,  not on meds, presented to ED with  complaints of  left sided to substernal chest pain  associated with mid back pain, pt does endorsed radiations of pain to  left arm along with dyspnea on exertion. He denies recent illnesses, fever, cough, vomiting, abdominal pain. pt is a smoker. And his younger bother has a cardiac stent at age of 55 and father had CABG at age of 71. Pt underwent CTA coronary in ED which was consistent with CAD RAD category 3 with RCA territory stenosis . Pt will be admitted for further workup,     #) Typical Chest Pain in high risk Pt  - ACS ruled out already, Trops neg x2  - CTA consistent with RCA stenosis  - Echo: EF 60-65%  - Cholesterol panel consistent with elevation , pt is a daily smoker ( will benefit from Chantix)  - pt started on ASA, statin, bb  - cath today, labs optimized    #) DLD  - statin    # HTN - controlled  - not on meds    # Nicotine dependence  - recommend cessation    # DVT ppx - Heparin sc  # GI ppx - not indicated  # Diet - DASH  Full code Hospital Day:  2d    Subjective: Patient is a 49y old  Male who presents with a chief complaint of Chest pain (01 Mar 2020 09:35)    Pt seen and evaluated at bedside. No over the night acute events reported.   Donald CP/SOB/Abd pain/Nausea/Vomiting/Dysuria today.  pt to go for cath today. labs optimized.     Past Medical Hx:   HLD (hyperlipidemia)    Past Sx:    Allergies:  No Known Allergies    Current Meds:   Standng Meds:  aspirin  chewable 81 milliGRAM(s) Oral daily  atorvastatin 80 milliGRAM(s) Oral at bedtime  heparin  Injectable 5000 Unit(s) SubCutaneous every 8 hours  metoprolol tartrate 12.5 milliGRAM(s) Oral every 6 hours    PRN Meds:    HOME MEDICATIONS:    Vital Signs:   T(F): 98.2 (03-02-20 @ 04:52), Max: 98.2 (03-02-20 @ 04:52)  HR: 72 (03-02-20 @ 04:52) (68 - 76)  BP: 116/68 (03-02-20 @ 04:52) (104/65 - 125/79)  RR: 18 (03-02-20 @ 04:52) (18 - 18)  SpO2: --      03-01-20 @ 07:01  -  03-02-20 @ 07:00  --------------------------------------------------------  IN: 160 mL / OUT: 0 mL / NET: 160 mL        Physical Exam:   GENERAL: NAD, resting in bed  HEENT: NCAT  Pulm: CTAB, no wheezing or labored respirations.   CV: Regular rate and rhythm; s1 s2 appreciated, No murmurs, rubs, or gallops  GI: Soft, Nontender, Nondistended; Bowel sounds present  EXTREMITIES: No LE edema b/l  NERVOUS SYSTEM:  Alert & Oriented X3      Labs:                         15.0   9.31  )-----------( 309      ( 02 Mar 2020 05:51 )             44.7     Neutophil% 48.0, Lymphocyte% 39.7, Monocyte% 6.9, Bands% 0.3 03-02-20 @ 05:51    02 Mar 2020 05:51    142    |  102    |  13     ----------------------------<  83     5.1     |  26     |  0.9      Ca    9.8        02 Mar 2020 05:51  Mg     2.0       02 Mar 2020 05:51         pTT    35.6             ----< 1.01 INR  (03-02-20 @ 05:51)    11.60        PT    Amylase --, Lipase 33, 02-28-20 @ 18:40    Troponin <0.01, CKMB --, CK -- 02-29-20 @ 00:04  Troponin <0.01, CKMB --, CK -- 02-28-20 @ 18:40    Radiology:   < from: Transthoracic Echocardiogram (03.01.20 @ 14:48) >  Summary:   1. Left ventricular ejection fraction, by visual estimation, is 60 to 65%.   2. Normal left ventricular size and wall thicknesses, with normal systolic and diastolic function.   3. The mean global longitudinal peak strain by speckle tracking is -17.9% which is borderline normal.   4. Estimated pulmonary artery systolic pressure is 36.1 mmHg assuming a right atrial pressure of 3 mmHg, which is consistent with borderline pulmonary hypertension.   5. LA volume Index is 9.0 ml/m² ml/m2.    < end of copied text >    Assessment and Plan:    48y/o male with PMH of high triglycerides, DLD , HTN ,  not on meds, presented to ED with  complaints of  left sided to substernal chest pain  associated with mid back pain, pt does endorsed radiations of pain to  left arm along with dyspnea on exertion. He denies recent illnesses, fever, cough, vomiting, abdominal pain. pt is a smoker. And his younger bother has a cardiac stent at age of 55 and father had CABG at age of 71. Pt underwent CTA coronary in ED which was consistent with CAD RAD category 3 with RCA territory stenosis . Pt will be admitted for further workup,     #) ATypical Chest Pain and LEES   - ACS ruled out already, Trops neg x2  - CTA consistent with RCA stenosis  - Echo: EF 60-65%  - Cholesterol panel consistent with elevation , pt is a daily smoker ( will benefit from Chantix)  - pt started on ASA, statin, bb  - cath today, labs optimized    #) DLD  - statin    # HTN - controlled  - not on meds    # Nicotine dependence  - recommend cessation    # DVT ppx - Heparin sc  # GI ppx - not indicated  # Diet - DASH  Full code

## 2020-03-03 ENCOUNTER — TRANSCRIPTION ENCOUNTER (OUTPATIENT)
Age: 50
End: 2020-03-03

## 2020-03-03 VITALS
TEMPERATURE: 97 F | SYSTOLIC BLOOD PRESSURE: 110 MMHG | RESPIRATION RATE: 18 BRPM | HEART RATE: 65 BPM | DIASTOLIC BLOOD PRESSURE: 66 MMHG

## 2020-03-03 PROBLEM — Z00.00 ENCOUNTER FOR PREVENTIVE HEALTH EXAMINATION: Status: ACTIVE | Noted: 2020-03-03

## 2020-03-03 PROBLEM — E78.5 HYPERLIPIDEMIA, UNSPECIFIED: Chronic | Status: ACTIVE | Noted: 2020-02-28

## 2020-03-03 LAB
ANION GAP SERPL CALC-SCNC: 11 MMOL/L — SIGNIFICANT CHANGE UP (ref 7–14)
BASOPHILS # BLD AUTO: 0.06 K/UL — SIGNIFICANT CHANGE UP (ref 0–0.2)
BASOPHILS NFR BLD AUTO: 0.7 % — SIGNIFICANT CHANGE UP (ref 0–1)
BUN SERPL-MCNC: 14 MG/DL — SIGNIFICANT CHANGE UP (ref 10–20)
CALCIUM SERPL-MCNC: 9.1 MG/DL — SIGNIFICANT CHANGE UP (ref 8.5–10.1)
CHLORIDE SERPL-SCNC: 105 MMOL/L — SIGNIFICANT CHANGE UP (ref 98–110)
CO2 SERPL-SCNC: 25 MMOL/L — SIGNIFICANT CHANGE UP (ref 17–32)
CREAT SERPL-MCNC: 0.8 MG/DL — SIGNIFICANT CHANGE UP (ref 0.7–1.5)
EOSINOPHIL # BLD AUTO: 0.45 K/UL — SIGNIFICANT CHANGE UP (ref 0–0.7)
EOSINOPHIL NFR BLD AUTO: 5.2 % — SIGNIFICANT CHANGE UP (ref 0–8)
GLUCOSE SERPL-MCNC: 89 MG/DL — SIGNIFICANT CHANGE UP (ref 70–99)
HCT VFR BLD CALC: 42.4 % — SIGNIFICANT CHANGE UP (ref 42–52)
HGB BLD-MCNC: 14.6 G/DL — SIGNIFICANT CHANGE UP (ref 14–18)
IMM GRANULOCYTES NFR BLD AUTO: 0.2 % — SIGNIFICANT CHANGE UP (ref 0.1–0.3)
LYMPHOCYTES # BLD AUTO: 3.27 K/UL — SIGNIFICANT CHANGE UP (ref 1.2–3.4)
LYMPHOCYTES # BLD AUTO: 37.6 % — SIGNIFICANT CHANGE UP (ref 20.5–51.1)
MAGNESIUM SERPL-MCNC: 2 MG/DL — SIGNIFICANT CHANGE UP (ref 1.8–2.4)
MCHC RBC-ENTMCNC: 30.4 PG — SIGNIFICANT CHANGE UP (ref 27–31)
MCHC RBC-ENTMCNC: 34.4 G/DL — SIGNIFICANT CHANGE UP (ref 32–37)
MCV RBC AUTO: 88.3 FL — SIGNIFICANT CHANGE UP (ref 80–94)
MONOCYTES # BLD AUTO: 0.78 K/UL — HIGH (ref 0.1–0.6)
MONOCYTES NFR BLD AUTO: 9 % — SIGNIFICANT CHANGE UP (ref 1.7–9.3)
NEUTROPHILS # BLD AUTO: 4.11 K/UL — SIGNIFICANT CHANGE UP (ref 1.4–6.5)
NEUTROPHILS NFR BLD AUTO: 47.3 % — SIGNIFICANT CHANGE UP (ref 42.2–75.2)
NRBC # BLD: 0 /100 WBCS — SIGNIFICANT CHANGE UP (ref 0–0)
PLATELET # BLD AUTO: 299 K/UL — SIGNIFICANT CHANGE UP (ref 130–400)
POTASSIUM SERPL-MCNC: 4.4 MMOL/L — SIGNIFICANT CHANGE UP (ref 3.5–5)
POTASSIUM SERPL-SCNC: 4.4 MMOL/L — SIGNIFICANT CHANGE UP (ref 3.5–5)
RBC # BLD: 4.8 M/UL — SIGNIFICANT CHANGE UP (ref 4.7–6.1)
RBC # FLD: 12 % — SIGNIFICANT CHANGE UP (ref 11.5–14.5)
SODIUM SERPL-SCNC: 141 MMOL/L — SIGNIFICANT CHANGE UP (ref 135–146)
WBC # BLD: 8.69 K/UL — SIGNIFICANT CHANGE UP (ref 4.8–10.8)
WBC # FLD AUTO: 8.69 K/UL — SIGNIFICANT CHANGE UP (ref 4.8–10.8)

## 2020-03-03 PROCEDURE — 99239 HOSP IP/OBS DSCHRG MGMT >30: CPT

## 2020-03-03 RX ORDER — ASPIRIN/CALCIUM CARB/MAGNESIUM 324 MG
1 TABLET ORAL
Qty: 30 | Refills: 0
Start: 2020-03-03 | End: 2020-04-01

## 2020-03-03 RX ORDER — METOPROLOL TARTRATE 50 MG
1 TABLET ORAL
Qty: 30 | Refills: 0
Start: 2020-03-03 | End: 2020-04-01

## 2020-03-03 RX ORDER — ATORVASTATIN CALCIUM 80 MG/1
1 TABLET, FILM COATED ORAL
Qty: 30 | Refills: 0
Start: 2020-03-03 | End: 2020-04-01

## 2020-03-03 RX ADMIN — HEPARIN SODIUM 5000 UNIT(S): 5000 INJECTION INTRAVENOUS; SUBCUTANEOUS at 13:43

## 2020-03-03 RX ADMIN — Medication 12.5 MILLIGRAM(S): at 05:53

## 2020-03-03 RX ADMIN — HEPARIN SODIUM 5000 UNIT(S): 5000 INJECTION INTRAVENOUS; SUBCUTANEOUS at 05:53

## 2020-03-03 RX ADMIN — Medication 81 MILLIGRAM(S): at 11:34

## 2020-03-03 RX ADMIN — Medication 12.5 MILLIGRAM(S): at 11:34

## 2020-03-03 NOTE — DISCHARGE NOTE NURSING/CASE MANAGEMENT/SOCIAL WORK - NSDCPEEMAIL_GEN_ALL_CORE
[facility] for Tobacco Control email [email or web address]

## 2020-03-03 NOTE — DISCHARGE NOTE NURSING/CASE MANAGEMENT/SOCIAL WORK - PATIENT PORTAL LINK FT
You can access the FollowMyHealth Patient Portal offered by [facility] by registering at the following website: [email or web address]. By joining [employer]’s FollowMyHealth portal, you will also be able to view your health information using other applications (apps) compatible with our system.

## 2020-03-03 NOTE — PROGRESS NOTE ADULT - ASSESSMENT
A/P   LEES and atypical chest pain: resolved   CT coronaries showed mod stenosis in RCA   Cath done 3/2/20 and pending report   cardiology called   asa, BB, lipitor     HLD lipitor     #Progress Note Handoff  Pending (specify):  cath report and cardiology to clear   Family discussion: patient   Disposition: Home

## 2020-03-03 NOTE — CHART NOTE - NSCHARTNOTEFT_GEN_A_CORE
PRE-OP DIAGNOSIS: suspected CAD    PROCEDURE: [facility] with coronary angiography    Physician: Dr Duran  Assistant: Ruth Brennan    ANESTHESIA TYPE:  [  ]General Anesthesia  [  ] Sedation  [ x ] Local/Regional    ESTIMATED BLOOD LOSS:    10   mL    CONDITION  [  ] Critical  [  ] Serious  [  ]Fair  [ x ]Good      SPECIMENS REMOVED (IF APPLICABLE): N/A            IMPLANTS (IF APPLICABLE)      FINDINGS    Left Heart Catheterization:  non obstructive CAD  Mid RCA 50% non significant by iFR    DOMINANCE: Right    ACCESS: right radial   CLOSURE: D stat    INTERVENTION  none           PLAN OF CARE    [x ] Return to In-patient bed  medical treatment   Results of procedure/ plan of care discussed with patient/  in detail. PRE-OP DIAGNOSIS:  Unstable Angina    POST-OP DIAGNOSIS    PROCEDURE: [facility] with coronary angiography    Physician: Dr Duran  Assistant: Ruth Brennan    ANESTHESIA TYPE:  [  ]General Anesthesia  [  ] Sedation  [ x ] Local/Regional    ESTIMATED BLOOD LOSS:    10   mL    CONDITION  [  ] Critical  [  ] Serious  [  ]Fair  [ x ]Good      SPECIMENS REMOVED (IF APPLICABLE): N/A            IMPLANTS (IF APPLICABLE)      FINDINGS    Left Heart Catheterization:  non obstructive CAD  Mid RCA 50% non significant by iFR    DOMINANCE: Right    ACCESS: right radial   CLOSURE: D stat    INTERVENTION  none           PLAN OF CARE    [x ] Return to In-patient bed  medical treatment   Results of procedure/ plan of care discussed with patient/  in detail. PRE-OP DIAGNOSIS:  Unstable Angina    POST-OP DIAGNOSIS:  CAD    PROCEDURE:  [facility] / Coronary Angiography    Physician: Justin Duran MD  Assistant: Fellow    ANESTHESIA TYPE:  [  ] General Anesthesia  [  ] Sedation  [x] Local/Regional    ESTIMATED BLOOD LOSS: 10 mL    CONDITION:  [  ] Critical  [  ] Serious  [  ] Fair  [x] Good    ACCESS:  6F Right RA --> D-Stat    IV Contrast: 50 mL    SPECIMENS REMOVED (IF APPLICABLE): N/A    IMPLANTS (IF APPLICABLE):  N/A    FINDINGS:  Dominance: Right    50% mid RCA (not significant by iFR)    Diffuse ST/T changes with b/l contrast injections (possible endothelial dysfunction).    INTERVENTION: None    COMPLICATIONS: None    PLAN OF CARE:  Medical therapy and risk factor modification.  Outpatient follow-up.

## 2020-03-03 NOTE — DISCHARGE NOTE PROVIDER - NSFOLLOWUPCLINICS_GEN_ALL_ED_FT
[facility]  Medicine  [address], [place]   Phone: [phone]  Fax:   Follow Up Time: 1 week

## 2020-03-03 NOTE — DISCHARGE NOTE PROVIDER - CARE PROVIDER_API CALL
Justin Duran)  Cardiovascular Disease; Internal Medicine  [address] [place] [address]  Phone: [phone]  Fax: [phone]  Follow Up Time: 1 month

## 2020-03-03 NOTE — DISCHARGE NOTE PROVIDER - HOSPITAL COURSE
50y/o male with PMH of high triglycerides, DLD , HTN ,  not on meds, presented to ED with  complaints of  left sided to substernal chest pain  associated with mid back pain, pt does endorsed radiations of pain to  left arm along with dyspnea on exertion. He denies recent illnesses, fever, cough, vomiting, abdominal pain. pt is a smoker. And his younger bother has a cardiac stent at age of 55 and father had CABG at age of 71. Pt underwent CTA coronary in ED which was consistent with CAD RAD category 3 with RCA territory stenosis . Pt was admitted for further ischemic work up. Echo with EF 60 - 65%. Trops negative x2. Was taken for cath and found to have 50% stenosis of RCA. No intervention was done. Pt will be treated medically. Pt currently hemodynamically stable. [facility] cath access site with dressing and c/d/i. no hematoma or bleeding. Pt medically stable for discharge. Discussed with attending.

## 2020-03-03 NOTE — DISCHARGE NOTE PROVIDER - NSDCCPCAREPLAN_GEN_ALL_CORE_FT
PRINCIPAL DISCHARGE DIAGNOSIS  Diagnosis: Chest pain  Assessment and Plan of Treatment: You came in with the complaint of Chest pain. You had CTA coronary done in ED which showed Right Coronary Artery stenosis. You were taken for cath that showed 50% stenosis of the same artery. There was no intervention done in cath. Please take medication as prescribed and follow up with cardiology outpatient in 1 month. Please visit your primary care provider or visit nearest ED if your symptoms return.

## 2020-03-03 NOTE — PROGRESS NOTE ADULT - SUBJECTIVE AND OBJECTIVE BOX
no chest pain   no sob   s/p cath yesterday     Vital Signs Last 24 Hrs  T(C): 35.8 (03 Mar 2020 05:17), Max: 37 (02 Mar 2020 20:30)  T(F): 96.5 (03 Mar 2020 05:17), Max: 98.6 (02 Mar 2020 20:30)  HR: 61 (03 Mar 2020 05:17) (61 - 71)  BP: 125/78 (03 Mar 2020 05:17) (109/71 - 128/82)  BP(mean): --  RR: 18 (03 Mar 2020 05:17) (18 - 18)  SpO2: 93% (02 Mar 2020 11:08) (93% - 93%)    PHYSICAL EXAM:  GENERAL: NAD,   Pulm: Clear to auscultation bilaterally; No wheeze  CV: Regular rate and rhythm; No murmurs, rubs, or gallops  GI: Soft, Nontender, Nondistended; Bowel sounds present  EXTREMITIES:  2+ Peripheral Pulses, No clubbing, cyanosis, or edema  PSYCH: AAOx3  NEUROLOGY: non-focal  SKIN: No rashes or lesions                          14.6   8.69  )-----------( 299      ( 03 Mar 2020 05:42 )             42.4     03-03    141  |  105  |  14  ----------------------------<  89  4.4   |  25  |  0.8    Ca    9.1      03 Mar 2020 05:42  Mg     2.0     03-03        PT/INR - ( 02 Mar 2020 05:51 )   PT: 11.60 sec;   INR: 1.01 ratio         PTT - ( 02 Mar 2020 05:51 )  PTT:35.6 sec      MEDICATIONS  (STANDING):  aspirin  chewable 81 milliGRAM(s) Oral daily  atorvastatin 80 milliGRAM(s) Oral at bedtime  heparin  Injectable 5000 Unit(s) SubCutaneous every 8 hours  metoprolol tartrate 12.5 milliGRAM(s) Oral every 6 hours  sodium chloride 0.9%. 1000 milliLiter(s) (75 mL/Hr) IV Continuous <Continuous>    MEDICATIONS  (PRN):  acetaminophen   Tablet .. 650 milliGRAM(s) Oral every 6 hours PRN Severe Pain (7 - 10)

## 2020-03-03 NOTE — DISCHARGE NOTE NURSING/CASE MANAGEMENT/SOCIAL WORK - NSDCPEWEB_GEN_ALL_CORE
[facility] for Tobacco Control website --- [email or web address] website --- [email or web address]

## 2020-03-03 NOTE — DISCHARGE NOTE PROVIDER - NSDCFUADDINST_GEN_ALL_CORE_FT
You have an appointment scheduled with a primary care provider at [facility] at [address] [place] [address] on 3/11/20 at 1:30pm.

## 2020-03-03 NOTE — DISCHARGE NOTE PROVIDER - NSDCMRMEDTOKEN_GEN_ALL_CORE_FT
aspirin 81 mg oral tablet, chewable: 1 tab(s) orally once a day  atorvastatin 80 mg oral tablet: 1 tab(s) orally once a day (at bedtime)  Toprol-XL 50 mg oral tablet, extended release: 1 tab(s) orally once a day

## 2020-03-03 NOTE — CHART NOTE - NSCHARTNOTEFT_GEN_A_CORE
<<<RESIDENT DISCHARGE NOTE>>>     ISREAL MCNULTY  MRN-[id number]    VITAL SIGNS:  T(F): 96.6 (03-03-20 @ 13:19), Max: 98.6 (03-02-20 @ 20:30)  HR: 65 (03-03-20 @ 13:19)  BP: 110/66 (03-03-20 @ 13:19)  SpO2: --      PHYSICAL EXAMINATION:  GENERAL: NAD, resting in bed  HEENT: NCAT  Pulm: CTAB, no wheezing or labored respirations.   CV: Regular rate and rhythm; s1 s2 appreciated, No murmurs, rubs, or gallops  GI: Soft, Nontender, Nondistended; Bowel sounds present  EXTREMITIES: No LE edema b/l. Cardiac cath site on Rt arm w/ dressing C/D/I. no hematoma or bleeding.   NERVOUS SYSTEM:  Alert & Oriented X3    TEST RESULTS:                        14.6   8.69  )-----------( 299      ( 03 Mar 2020 05:42 )             42.4       03-03    141  |  105  |  14  ----------------------------<  89  4.4   |  25  |  0.8    Ca    9.1      03 Mar 2020 05:42  Mg     2.0     03-03        FINAL DISCHARGE INTERVIEW:  Resident(s) Present: (Name: John Wilkerson DO)    DISCHARGE MEDICATION RECONCILIATION  reviewed with Attending (Name: Dr. Krishna)    DISPOSITION:   [ x ] Home,    [  ] Home with Visiting Nursing Services,   [    ]  SNF/ NH,    [   ] Acute Rehab (4A),   [   ] Other (Specify:_________)

## 2020-03-08 DIAGNOSIS — F17.200 NICOTINE DEPENDENCE, UNSPECIFIED, UNCOMPLICATED: ICD-10-CM

## 2020-03-08 DIAGNOSIS — Z82.49 FAMILY HISTORY OF ISCHEMIC HEART DISEASE AND OTHER DISEASES OF THE CIRCULATORY SYSTEM: ICD-10-CM

## 2020-03-08 DIAGNOSIS — E78.5 HYPERLIPIDEMIA, UNSPECIFIED: ICD-10-CM

## 2020-03-08 DIAGNOSIS — I10 ESSENTIAL (PRIMARY) HYPERTENSION: ICD-10-CM

## 2020-03-08 DIAGNOSIS — R07.9 CHEST PAIN, UNSPECIFIED: ICD-10-CM

## 2020-03-11 ENCOUNTER — APPOINTMENT (OUTPATIENT)
Dept: INTERNAL MEDICINE | Facility: CLINIC | Age: 50
End: 2020-03-11

## 2020-07-20 NOTE — ED ADULT NURSE NOTE - CAS EDN DISCHARGE ASSESSMENT
PDMP reviewed; no aberrant behavior identified, prescription authorized.   Alert and oriented to person, place and time

## 2020-12-18 ENCOUNTER — APPOINTMENT (OUTPATIENT)
Dept: CARDIOLOGY | Facility: CLINIC | Age: 50
End: 2020-12-18

## 2023-02-06 ENCOUNTER — EMERGENCY (EMERGENCY)
Facility: HOSPITAL | Age: 53
LOS: 0 days | Discharge: HOME | End: 2023-02-07
Attending: EMERGENCY MEDICINE
Payer: MEDICAID

## 2023-02-06 VITALS
HEART RATE: 85 BPM | TEMPERATURE: 98 F | DIASTOLIC BLOOD PRESSURE: 78 MMHG | RESPIRATION RATE: 20 BRPM | OXYGEN SATURATION: 98 % | SYSTOLIC BLOOD PRESSURE: 122 MMHG

## 2023-02-06 DIAGNOSIS — I25.10 ATHEROSCLEROTIC HEART DISEASE OF NATIVE CORONARY ARTERY WITHOUT ANGINA PECTORIS: ICD-10-CM

## 2023-02-06 DIAGNOSIS — E78.00 PURE HYPERCHOLESTEROLEMIA, UNSPECIFIED: ICD-10-CM

## 2023-02-06 DIAGNOSIS — Z20.822 CONTACT WITH AND (SUSPECTED) EXPOSURE TO COVID-19: ICD-10-CM

## 2023-02-06 DIAGNOSIS — Z82.49 FAMILY HISTORY OF ISCHEMIC HEART DISEASE AND OTHER DISEASES OF THE CIRCULATORY SYSTEM: ICD-10-CM

## 2023-02-06 DIAGNOSIS — R07.89 OTHER CHEST PAIN: ICD-10-CM

## 2023-02-06 DIAGNOSIS — R07.9 CHEST PAIN, UNSPECIFIED: ICD-10-CM

## 2023-02-06 DIAGNOSIS — Z79.82 LONG TERM (CURRENT) USE OF ASPIRIN: ICD-10-CM

## 2023-02-06 DIAGNOSIS — I10 ESSENTIAL (PRIMARY) HYPERTENSION: ICD-10-CM

## 2023-02-06 PROCEDURE — 85025 COMPLETE CBC W/AUTO DIFF WBC: CPT

## 2023-02-06 PROCEDURE — 99285 EMERGENCY DEPT VISIT HI MDM: CPT | Mod: 25

## 2023-02-06 PROCEDURE — 84484 ASSAY OF TROPONIN QUANT: CPT

## 2023-02-06 PROCEDURE — G0378: CPT

## 2023-02-06 PROCEDURE — 78452 HT MUSCLE IMAGE SPECT MULT: CPT | Mod: ME

## 2023-02-06 PROCEDURE — 36415 COLL VENOUS BLD VENIPUNCTURE: CPT

## 2023-02-06 PROCEDURE — 87635 SARS-COV-2 COVID-19 AMP PRB: CPT

## 2023-02-06 PROCEDURE — 93017 CV STRESS TEST TRACING ONLY: CPT

## 2023-02-06 PROCEDURE — G1004: CPT

## 2023-02-06 PROCEDURE — 93010 ELECTROCARDIOGRAM REPORT: CPT

## 2023-02-06 PROCEDURE — A9500: CPT

## 2023-02-06 PROCEDURE — 80053 COMPREHEN METABOLIC PANEL: CPT

## 2023-02-06 PROCEDURE — 93005 ELECTROCARDIOGRAM TRACING: CPT

## 2023-02-06 PROCEDURE — 99285 EMERGENCY DEPT VISIT HI MDM: CPT

## 2023-02-06 PROCEDURE — 71046 X-RAY EXAM CHEST 2 VIEWS: CPT

## 2023-02-06 RX ORDER — ATORVASTATIN CALCIUM 80 MG/1
20 TABLET, FILM COATED ORAL ONCE
Refills: 0 | Status: COMPLETED | OUTPATIENT
Start: 2023-02-06 | End: 2023-02-06

## 2023-02-06 RX ORDER — ASPIRIN/CALCIUM CARB/MAGNESIUM 324 MG
162 TABLET ORAL ONCE
Refills: 0 | Status: COMPLETED | OUTPATIENT
Start: 2023-02-06 | End: 2023-02-06

## 2023-02-06 RX ADMIN — Medication 162 MILLIGRAM(S): at 23:26

## 2023-02-06 RX ADMIN — ATORVASTATIN CALCIUM 20 MILLIGRAM(S): 80 TABLET, FILM COATED ORAL at 23:32

## 2023-02-07 VITALS
HEART RATE: 72 BPM | SYSTOLIC BLOOD PRESSURE: 129 MMHG | OXYGEN SATURATION: 98 % | DIASTOLIC BLOOD PRESSURE: 80 MMHG | RESPIRATION RATE: 18 BRPM | TEMPERATURE: 98 F

## 2023-02-07 LAB
ALBUMIN SERPL ELPH-MCNC: 4.6 G/DL — SIGNIFICANT CHANGE UP (ref 3.5–5.2)
ALP SERPL-CCNC: 79 U/L — SIGNIFICANT CHANGE UP (ref 30–115)
ALT FLD-CCNC: 16 U/L — SIGNIFICANT CHANGE UP (ref 0–41)
ANION GAP SERPL CALC-SCNC: 11 MMOL/L — SIGNIFICANT CHANGE UP (ref 7–14)
AST SERPL-CCNC: 17 U/L — SIGNIFICANT CHANGE UP (ref 0–41)
BASOPHILS # BLD AUTO: 0.05 K/UL — SIGNIFICANT CHANGE UP (ref 0–0.2)
BASOPHILS NFR BLD AUTO: 0.7 % — SIGNIFICANT CHANGE UP (ref 0–1)
BILIRUB SERPL-MCNC: 0.4 MG/DL — SIGNIFICANT CHANGE UP (ref 0.2–1.2)
BUN SERPL-MCNC: 17 MG/DL — SIGNIFICANT CHANGE UP (ref 10–20)
CALCIUM SERPL-MCNC: 9.8 MG/DL — SIGNIFICANT CHANGE UP (ref 8.4–10.5)
CHLORIDE SERPL-SCNC: 104 MMOL/L — SIGNIFICANT CHANGE UP (ref 98–110)
CO2 SERPL-SCNC: 26 MMOL/L — SIGNIFICANT CHANGE UP (ref 17–32)
CREAT SERPL-MCNC: 0.8 MG/DL — SIGNIFICANT CHANGE UP (ref 0.7–1.5)
EGFR: 108 ML/MIN/1.73M2 — SIGNIFICANT CHANGE UP
EOSINOPHIL # BLD AUTO: 0.48 K/UL — SIGNIFICANT CHANGE UP (ref 0–0.7)
EOSINOPHIL NFR BLD AUTO: 6.4 % — SIGNIFICANT CHANGE UP (ref 0–8)
GLUCOSE SERPL-MCNC: 77 MG/DL — SIGNIFICANT CHANGE UP (ref 70–99)
HCT VFR BLD CALC: 46.1 % — SIGNIFICANT CHANGE UP (ref 42–52)
HGB BLD-MCNC: 15.1 G/DL — SIGNIFICANT CHANGE UP (ref 14–18)
IMM GRANULOCYTES NFR BLD AUTO: 0.3 % — SIGNIFICANT CHANGE UP (ref 0.1–0.3)
LYMPHOCYTES # BLD AUTO: 2.5 K/UL — SIGNIFICANT CHANGE UP (ref 1.2–3.4)
LYMPHOCYTES # BLD AUTO: 33.4 % — SIGNIFICANT CHANGE UP (ref 20.5–51.1)
MCHC RBC-ENTMCNC: 29.4 PG — SIGNIFICANT CHANGE UP (ref 27–31)
MCHC RBC-ENTMCNC: 32.8 G/DL — SIGNIFICANT CHANGE UP (ref 32–37)
MCV RBC AUTO: 89.7 FL — SIGNIFICANT CHANGE UP (ref 80–94)
MONOCYTES # BLD AUTO: 0.55 K/UL — SIGNIFICANT CHANGE UP (ref 0.1–0.6)
MONOCYTES NFR BLD AUTO: 7.3 % — SIGNIFICANT CHANGE UP (ref 1.7–9.3)
NEUTROPHILS # BLD AUTO: 3.89 K/UL — SIGNIFICANT CHANGE UP (ref 1.4–6.5)
NEUTROPHILS NFR BLD AUTO: 51.9 % — SIGNIFICANT CHANGE UP (ref 42.2–75.2)
NRBC # BLD: 0 /100 WBCS — SIGNIFICANT CHANGE UP (ref 0–0)
PLATELET # BLD AUTO: 328 K/UL — SIGNIFICANT CHANGE UP (ref 130–400)
POTASSIUM SERPL-MCNC: 4.9 MMOL/L — SIGNIFICANT CHANGE UP (ref 3.5–5)
POTASSIUM SERPL-SCNC: 4.9 MMOL/L — SIGNIFICANT CHANGE UP (ref 3.5–5)
PROT SERPL-MCNC: 7.1 G/DL — SIGNIFICANT CHANGE UP (ref 6–8)
RBC # BLD: 5.14 M/UL — SIGNIFICANT CHANGE UP (ref 4.7–6.1)
RBC # FLD: 12 % — SIGNIFICANT CHANGE UP (ref 11.5–14.5)
SARS-COV-2 RNA SPEC QL NAA+PROBE: SIGNIFICANT CHANGE UP
SODIUM SERPL-SCNC: 141 MMOL/L — SIGNIFICANT CHANGE UP (ref 135–146)
TROPONIN T SERPL-MCNC: <0.01 NG/ML — SIGNIFICANT CHANGE UP
TROPONIN T SERPL-MCNC: <0.01 NG/ML — SIGNIFICANT CHANGE UP
WBC # BLD: 7.49 K/UL — SIGNIFICANT CHANGE UP (ref 4.8–10.8)
WBC # FLD AUTO: 7.49 K/UL — SIGNIFICANT CHANGE UP (ref 4.8–10.8)

## 2023-02-07 PROCEDURE — 78452 HT MUSCLE IMAGE SPECT MULT: CPT | Mod: 26,ME

## 2023-02-07 PROCEDURE — 93016 CV STRESS TEST SUPVJ ONLY: CPT

## 2023-02-07 PROCEDURE — 71046 X-RAY EXAM CHEST 2 VIEWS: CPT | Mod: 26

## 2023-02-07 PROCEDURE — 93018 CV STRESS TEST I&R ONLY: CPT

## 2023-02-07 PROCEDURE — 99236 HOSP IP/OBS SAME DATE HI 85: CPT

## 2023-02-07 PROCEDURE — 93010 ELECTROCARDIOGRAM REPORT: CPT

## 2023-02-07 PROCEDURE — G1004: CPT

## 2023-02-07 RX ORDER — METOPROLOL TARTRATE 50 MG
1 TABLET ORAL
Qty: 30 | Refills: 0
Start: 2023-02-07 | End: 2023-03-08

## 2023-02-07 RX ORDER — ASPIRIN/CALCIUM CARB/MAGNESIUM 324 MG
1 TABLET ORAL
Qty: 30 | Refills: 0
Start: 2023-02-07 | End: 2023-03-08

## 2023-02-07 RX ORDER — ATORVASTATIN CALCIUM 80 MG/1
1 TABLET, FILM COATED ORAL
Qty: 30 | Refills: 0
Start: 2023-02-07 | End: 2023-03-08

## 2023-02-07 NOTE — ED CDU PROVIDER DISPOSITION NOTE - PROVIDER TOKENS
FREE:[LAST:[follow up with your primary medical doctor],PHONE:[(   )    -],FAX:[(   )    -],FOLLOWUP:[4-6 Days]],FREE:[LAST:[follow up with your cardiologist apointment on March 9],PHONE:[(   )    -],FAX:[(   )    -],FOLLOWUP:[4-6 Days]]

## 2023-02-07 NOTE — ED CDU PROVIDER DISPOSITION NOTE - CARE PROVIDER_API CALL
follow up with your primary medical doctor,   Phone: (   )    -  Fax: (   )    -  Follow Up Time: 4-6 Days    follow up with your cardiologist apointment on March 9,   Phone: (   )    -  Fax: (   )    -  Follow Up Time: 4-6 Days

## 2023-02-07 NOTE — ED PROVIDER NOTE - OBJECTIVE STATEMENT
52 years old male history of hypertension, high cholesterol, nonobstructive CAD (CCTA 3 years ago with moderate stenosis), family history of CAD (multiple siblings and parents) present complaint substernal chest pain for 6 days.  Pain is off and on and sharp at times.  Denies diaphoresis, shortness of breath and weakness associated chest pain.  Pain with no radiations.  Denies chest pain ED now.  Denies exogenous hormone use/recent hospitalization/hx of DVT. denies recent illness/fever/chill/HA/dizziness/sob/abd pain/n/v/d/urinary sxs.    Reports he had exercise stress test 2 months ago in [place] and he was negative.

## 2023-02-07 NOTE — ED ADULT NURSE REASSESSMENT NOTE - NS ED NURSE REASSESS COMMENT FT1
Pt reassessed A/O times 4 Vs stable stress test order is done completed ,pt is seen evaluate by Ed attending clear to go home with [facility] transport ,NAD noted.

## 2023-02-07 NOTE — ED PROVIDER NOTE - DIFFERENTIAL DIAGNOSIS
Differential Diagnosis Eval ACS, moderate to high risk on history. Eval with troponin, CXR, EKG.  Symmetric pulses, no radaition to the back, and no neurologic sypmtoms to suggest aortic pathology, no need for CT imaging at this time  Less likely pneumonia or pneumothorax given not primarily respiratory complaints. Eval with chest xray

## 2023-02-07 NOTE — ED PROVIDER NOTE - CLINICAL SUMMARY MEDICAL DECISION MAKING FREE TEXT BOX
Chest pain. Patient to be placed into observation status. There is a lack of diagnostic certainty, where a more precise diagnosis could decide inpatient admission or discharge to home, and/or need for therapeutic intensity, where extensive therapy has a reasonable possibility of abating the patient's presenting condition, and thereby prevents inpatient admission.

## 2023-02-07 NOTE — ED CDU PROVIDER INITIAL DAY NOTE - CONSIDERATION OF ADMISSION OBSERVATION
pt requires telemetry, serial eKG/enzymes, advanced cardiac imaging Consideration of Admission/Observation

## 2023-02-07 NOTE — ED CDU PROVIDER INITIAL DAY NOTE - ATTENDING APP SHARED VISIT CONTRIBUTION OF CARE
52-year-old man, history of hypertension, hyperlipidemia, nonobstructive CAD (CCTA CAD RADS 3 3 years ago), family history of CAD was placed in CDU for work-up of intermittent, sharp chest pain without associated shortness of breath, diaphoresis, nausea.  ED work-up was unremarkable.  Plan is for telemetry, serial EKG and enzymes, nuclear stress test.

## 2023-02-07 NOTE — ED CDU PROVIDER DISPOSITION NOTE - PATIENT PORTAL LINK FT
You can access the FollowMyHealth Patient Portal offered by [facility] by registering at the following website: [email or web address]. By joining [employer]’s FollowMyHealth portal, you will also be able to view your health information using other applications (apps) compatible with our system.

## 2023-02-07 NOTE — ED CDU PROVIDER INITIAL DAY NOTE - PROGRESS NOTE DETAILS
patient resting comfortably, no new complaints patient resting comfortably, no new complaints. stress resulted, patient has an appointment with his cardiologist on March 9

## 2023-02-07 NOTE — ED CDU PROVIDER DISPOSITION NOTE - ATTENDING CONTRIBUTION TO CARE
52-year-old man, history of hypertension, hyperlipidemia, nonobstructive CAD (CCTA CAD RADS 3 3 years ago), family history of CAD was placed in CDU for work-up of intermittent, sharp chest pain without associated shortness of breath, diaphoresis, nausea.  ED work-up was unremarkable.  Patient was monitored without incident, repeat EKG and enzymes unchanged.  Nuclear stress test was negative for acute ischemia.  Results were discussed with patient and he will follow-up with cardiology.  Return precautions also discussed and patient is comfortable with discharge.

## 2023-02-07 NOTE — ED PROVIDER NOTE - PROGRESS NOTE DETAILS
CXR: Lung fields b/l clear, cardiac and mediastinal structure silhouettes nl, bones wnl. Troponin neg.

## 2024-07-21 NOTE — ED CDU PROVIDER DISPOSITION NOTE - NS_OBSORDERDATE_ED_A_ED
Nurses Note -- 4 Eyes      7/20/2024   10:38 PM      Skin assessed during: Q Shift Change      [] No Altered Skin Integrity Present    []Prevention Measures Documented      [x] Yes- Altered Skin Integrity Present or Discovered-No new changes. Already documented   [] LDA Added if Not in Epic (Describe Wound)   [] New Altered Skin Integrity was Present on Admit and Documented in LDA   [] Wound Image Taken    Wound Care Consulted? No    Attending Nurse:  Mimi Quiroz RN/Staff Member:   LAURA Yepez          07-Feb-2023 02:02

## 2025-03-21 ENCOUNTER — APPOINTMENT (OUTPATIENT)
Dept: CT IMAGING | Facility: CLINIC | Age: 55
End: 2025-03-21
Payer: COMMERCIAL

## 2025-03-21 ENCOUNTER — OUTPATIENT (OUTPATIENT)
Dept: OUTPATIENT SERVICES | Facility: HOSPITAL | Age: 55
LOS: 1 days | End: 2025-03-21

## 2025-03-21 PROCEDURE — 75574 CT ANGIO HRT W/3D IMAGE: CPT | Mod: 26
